# Patient Record
Sex: MALE | Race: WHITE
[De-identification: names, ages, dates, MRNs, and addresses within clinical notes are randomized per-mention and may not be internally consistent; named-entity substitution may affect disease eponyms.]

---

## 2020-01-24 ENCOUNTER — HOSPITAL ENCOUNTER (EMERGENCY)
Dept: HOSPITAL 63 - ER | Age: 35
Discharge: HOME | End: 2020-01-24
Payer: OTHER GOVERNMENT

## 2020-01-24 VITALS — WEIGHT: 176.37 LBS | BODY MASS INDEX: 26.73 KG/M2 | HEIGHT: 68 IN

## 2020-01-24 VITALS — DIASTOLIC BLOOD PRESSURE: 89 MMHG | SYSTOLIC BLOOD PRESSURE: 135 MMHG

## 2020-01-24 DIAGNOSIS — S61.210A: Primary | ICD-10-CM
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
## 2020-01-24 PROCEDURE — 90715 TDAP VACCINE 7 YRS/> IM: CPT

## 2020-01-24 PROCEDURE — 12001 RPR S/N/AX/GEN/TRNK 2.5CM/<: CPT

## 2020-01-24 PROCEDURE — 90471 IMMUNIZATION ADMIN: CPT

## 2020-01-24 NOTE — PHYS DOC
Past History


Past Medical History:  No Pertinent History, Other


Past Surgical History:  Other


Additional Past Surgical Histo:  septoplasty 2004


Alcohol Use:  Occasionally





Adult General


Chief Complaint


Chief Complaint:  LACERATION/AVULSION





HPI


HPI





Patient is a 34 year old male who presents with complaint of right index 

fingertip laceration.  States this happened shortly prior to arrival.  The 

patient states that he accidentally cut his index finger on a kitchen knife 

while cleaning it.  States that it cut deeply down into the fingertip near the 

edge of the fingernail.  Bleeding controlled prior to arrival.  Denies any other

injuries.  States his tetanus immunization is likely not up-to-date as he does 

not remember if he has had an immunization within the last 5 years.  Notes no 

difficulty with bending the right index finger is normal.





Review of Systems


Review of Systems





Constitutional: Denies fever or chills []


Musculoskeletal: Denies back pain or joint pain []


Integument: Right index fingertip laceration[]


Neurologic: Denies headache, focal weakness or sensory changes []





All other systems were reviewed and found to be within normal limits, except as 

documented in this note.





Current Medications


Current Medications





Current Medications








 Medications


  (Trade)  Dose


 Ordered  Sig/Jessica  Start Time


 Stop Time Status Last Admin


Dose Admin


 


 Lidocaine HCl  20 ml  1X  ONCE  1/24/20 19:45


 1/24/20 19:49 DC  














Allergies


Allergies





Allergies








Coded Allergies Type Severity Reaction Last Updated Verified


 


  No Known Drug Allergies    1/24/20 No











Physical Exam


Physical Exam





Constitutional: Well developed, well nourished, no acute distress, non-toxic 

appearance. []


Skin: Warm, dry, 2.5 cm curvilinear laceration through the right ventral second 

fingertip extending to the medial border of nail bed, no visualized bone. [] 


Extremities: No tenderness, no cyanosis, no clubbing, ROM intact, no edema. [] 


Neurologic: Alert and oriented X 3, normal motor function, normal sensory 

function, no focal deficits noted. []





Current Patient Data


Vital Signs





                                   Vital Signs








  Date Time  Temp Pulse Resp B/P (MAP) Pulse Ox O2 Delivery O2 Flow Rate FiO2


 


1/24/20 19:37 98.0 118 16 135/89 (104) 96 Room Air  








Lab Results


Not performed





EKG


EKG


Not performed[]





Radiology/Procedures


Radiology/Procedures


Indication: Right index fingertip laceration





Procedure: The patient was placed in the appropriate position and anesthesia was

 achieved by injection of lidocaine 1% at the base of the right index finger 

using a digital block technique. The area was then irrigated with high-pressure 

saline and skin was prepped with Betadine. The laceration was closed using 

simple interrupted 4-0 nylon sutures.  The wound area was then dressed with 

Telfa and an aluminum fingertip splint was placed for protection of fingertip.  





Total repaired wound length: 2.5 cm. 





Other Items: Total suture count: 4





The patient tolerated the procedure without difficulty.





Complications: None.[]





Course & Med Decision Making


Course & Med Decision Making


Pertinent Labs and Imaging studies reviewed. (See chart for details)





Laceration was repaired as outlined in procedure note.  Advised follow-up with 

primary doctor in 10 days for reevaluation and removal of sutures.  Recommended 

basic wound care measures.  Advised return to emergency department for any 

worsening symptoms.  Patient was understanding and in agreement with treatment 

plan.





Dragon Disclaimer


Dragon Disclaimer


This electronic medical record was generated, in whole or in part, using a voice

 recognition dictation system.





Departure


Departure:


Impression:  


   Primary Impression:  


   Laceration


Disposition:  01 HOME, SELF-CARE


Condition:  IMPROVED


Referrals:  


PCP,UNKNOWN (PCP)


Patient Instructions:  Fingertip Laceration





Additional Instructions:  


Follow-up with your primary doctor in 10 days for reevaluation and removal of 

stitches.  Return to the emergency department for any worsening symptoms.











RIO FERMIN MD               Jan 24, 2020 20:10